# Patient Record
Sex: FEMALE | Race: WHITE | NOT HISPANIC OR LATINO | Employment: STUDENT | URBAN - METROPOLITAN AREA
[De-identification: names, ages, dates, MRNs, and addresses within clinical notes are randomized per-mention and may not be internally consistent; named-entity substitution may affect disease eponyms.]

---

## 2019-05-21 PROBLEM — M54.50 CHRONIC LEFT-SIDED LOW BACK PAIN WITHOUT SCIATICA: Status: ACTIVE | Noted: 2019-05-21

## 2019-05-21 PROBLEM — J06.9 VIRAL UPPER RESPIRATORY ILLNESS: Status: ACTIVE | Noted: 2019-05-21

## 2019-05-21 PROBLEM — M41.115 JUVENILE IDIOPATHIC SCOLIOSIS OF THORACOLUMBAR REGION: Status: ACTIVE | Noted: 2019-05-21

## 2019-05-21 PROBLEM — D50.9 IRON DEFICIENCY ANEMIA: Status: ACTIVE | Noted: 2019-05-21

## 2019-05-21 PROBLEM — Z72.0 TOBACCO ABUSE: Status: ACTIVE | Noted: 2019-05-21

## 2019-05-21 PROBLEM — G89.29 CHRONIC LEFT-SIDED LOW BACK PAIN WITHOUT SCIATICA: Status: ACTIVE | Noted: 2019-05-21

## 2019-05-28 ENCOUNTER — OFFICE VISIT (OUTPATIENT)
Dept: INTERNAL MEDICINE | Facility: CLINIC | Age: 24
End: 2019-05-28

## 2019-05-28 ENCOUNTER — LAB (OUTPATIENT)
Dept: LAB | Facility: HOSPITAL | Age: 24
End: 2019-05-28

## 2019-05-28 VITALS
WEIGHT: 131 LBS | DIASTOLIC BLOOD PRESSURE: 60 MMHG | HEART RATE: 70 BPM | BODY MASS INDEX: 20.56 KG/M2 | HEIGHT: 67 IN | SYSTOLIC BLOOD PRESSURE: 96 MMHG

## 2019-05-28 DIAGNOSIS — E53.8 B12 DEFICIENCY: ICD-10-CM

## 2019-05-28 DIAGNOSIS — D50.0 IRON DEFICIENCY ANEMIA DUE TO CHRONIC BLOOD LOSS: ICD-10-CM

## 2019-05-28 DIAGNOSIS — M25.511 CHRONIC RIGHT SHOULDER PAIN: Primary | ICD-10-CM

## 2019-05-28 DIAGNOSIS — N92.0 MENORRHAGIA WITH REGULAR CYCLE: ICD-10-CM

## 2019-05-28 DIAGNOSIS — M41.115 JUVENILE IDIOPATHIC SCOLIOSIS OF THORACOLUMBAR REGION: ICD-10-CM

## 2019-05-28 DIAGNOSIS — G89.29 CHRONIC RIGHT SHOULDER PAIN: Primary | ICD-10-CM

## 2019-05-28 DIAGNOSIS — Z87.891 QUIT SMOKING WITHIN PAST YEAR: ICD-10-CM

## 2019-05-28 PROBLEM — Z72.0 TOBACCO ABUSE: Status: RESOLVED | Noted: 2019-05-21 | Resolved: 2019-05-28

## 2019-05-28 PROBLEM — M54.50 CHRONIC LEFT-SIDED LOW BACK PAIN WITHOUT SCIATICA: Status: RESOLVED | Noted: 2019-05-21 | Resolved: 2019-05-28

## 2019-05-28 LAB
ANION GAP SERPL CALCULATED.3IONS-SCNC: 9.3 MMOL/L
BASOPHILS # BLD AUTO: 0.03 10*3/MM3 (ref 0–0.2)
BASOPHILS NFR BLD AUTO: 0.6 % (ref 0–1.5)
BUN BLD-MCNC: 10 MG/DL (ref 6–20)
BUN/CREAT SERPL: 16.4 (ref 7–25)
CALCIUM SPEC-SCNC: 9.1 MG/DL (ref 8.6–10.5)
CHLORIDE SERPL-SCNC: 95 MMOL/L (ref 98–107)
CO2 SERPL-SCNC: 27.7 MMOL/L (ref 22–29)
CREAT BLD-MCNC: 0.61 MG/DL (ref 0.57–1)
DEPRECATED RDW RBC AUTO: 43.3 FL (ref 37–54)
EOSINOPHIL # BLD AUTO: 0.14 10*3/MM3 (ref 0–0.4)
EOSINOPHIL NFR BLD AUTO: 2.7 % (ref 0.3–6.2)
ERYTHROCYTE [DISTWIDTH] IN BLOOD BY AUTOMATED COUNT: 11.9 % (ref 12.3–15.4)
FERRITIN SERPL-MCNC: 19.3 NG/ML (ref 13–150)
FOLATE SERPL-MCNC: 19.8 NG/ML (ref 4.78–24.2)
GFR SERPL CREATININE-BSD FRML MDRD: 123 ML/MIN/1.73
GLUCOSE BLD-MCNC: 68 MG/DL (ref 65–99)
HCT VFR BLD AUTO: 42.9 % (ref 34–46.6)
HGB BLD-MCNC: 13.5 G/DL (ref 12–15.9)
IMM GRANULOCYTES # BLD AUTO: 0.02 10*3/MM3 (ref 0–0.05)
IMM GRANULOCYTES NFR BLD AUTO: 0.4 % (ref 0–0.5)
IRON 24H UR-MRATE: 147 MCG/DL (ref 37–145)
IRON SATN MFR SERPL: 31 % (ref 20–50)
LYMPHOCYTES # BLD AUTO: 1.76 10*3/MM3 (ref 0.7–3.1)
LYMPHOCYTES NFR BLD AUTO: 33.6 % (ref 19.6–45.3)
MCH RBC QN AUTO: 30.8 PG (ref 26.6–33)
MCHC RBC AUTO-ENTMCNC: 31.5 G/DL (ref 31.5–35.7)
MCV RBC AUTO: 97.9 FL (ref 79–97)
MONOCYTES # BLD AUTO: 0.37 10*3/MM3 (ref 0.1–0.9)
MONOCYTES NFR BLD AUTO: 7.1 % (ref 5–12)
NEUTROPHILS # BLD AUTO: 2.92 10*3/MM3 (ref 1.7–7)
NEUTROPHILS NFR BLD AUTO: 55.6 % (ref 42.7–76)
NRBC BLD AUTO-RTO: 0 /100 WBC (ref 0–0.2)
PLATELET # BLD AUTO: 265 10*3/MM3 (ref 140–450)
PMV BLD AUTO: 10.6 FL (ref 6–12)
POTASSIUM BLD-SCNC: 4 MMOL/L (ref 3.5–5.2)
RBC # BLD AUTO: 4.38 10*6/MM3 (ref 3.77–5.28)
SODIUM BLD-SCNC: 132 MMOL/L (ref 136–145)
TIBC SERPL-MCNC: 474 MCG/DL (ref 298–536)
TRANSFERRIN SERPL-MCNC: 318 MG/DL (ref 200–360)
TSH SERPL DL<=0.05 MIU/L-ACNC: 2.04 MIU/ML (ref 0.27–4.2)
VIT B12 BLD-MCNC: 384 PG/ML (ref 211–946)
WBC NRBC COR # BLD: 5.24 10*3/MM3 (ref 3.4–10.8)

## 2019-05-28 PROCEDURE — 99214 OFFICE O/P EST MOD 30 MIN: CPT | Performed by: INTERNAL MEDICINE

## 2019-05-28 PROCEDURE — 84443 ASSAY THYROID STIM HORMONE: CPT

## 2019-05-28 PROCEDURE — 83540 ASSAY OF IRON: CPT

## 2019-05-28 PROCEDURE — 85025 COMPLETE CBC W/AUTO DIFF WBC: CPT

## 2019-05-28 PROCEDURE — 80048 BASIC METABOLIC PNL TOTAL CA: CPT

## 2019-05-28 PROCEDURE — 82607 VITAMIN B-12: CPT

## 2019-05-28 PROCEDURE — 82746 ASSAY OF FOLIC ACID SERUM: CPT

## 2019-05-28 PROCEDURE — 84466 ASSAY OF TRANSFERRIN: CPT

## 2019-05-28 PROCEDURE — 82728 ASSAY OF FERRITIN: CPT

## 2019-05-28 RX ORDER — IRON,CARBONYL/ASCORBIC ACID 100-250 MG
1 TABLET ORAL DAILY
COMMUNITY
Start: 2018-05-22 | End: 2019-05-28

## 2019-05-28 RX ORDER — LANOLIN ALCOHOL/MO/W.PET/CERES
CREAM (GRAM) TOPICAL
COMMUNITY
Start: 2018-05-22 | End: 2022-06-14

## 2019-05-28 RX ORDER — FLUTICASONE PROPIONATE 50 MCG
1-2 SPRAY, SUSPENSION (ML) NASAL DAILY
COMMUNITY
Start: 2017-04-04 | End: 2019-05-28

## 2019-05-28 RX ORDER — NAPROXEN SODIUM 220 MG
1 TABLET ORAL EVERY 12 HOURS
COMMUNITY
Start: 2016-11-09 | End: 2019-05-28

## 2019-05-28 RX ORDER — NORGESTREL AND ETHINYL ESTRADIOL 0.3-0.03MG
KIT ORAL
Refills: 1 | COMMUNITY
Start: 2019-05-21 | End: 2019-08-27 | Stop reason: ALTCHOICE

## 2019-05-28 NOTE — PATIENT INSTRUCTIONS
For right shoulder pain and popping, we are referring to [facility].  She probably has some rotator cuff laxity.  Strengthening the muscles should help with her discomfort.    For  heavy periods with regular cycle and iron and B12 deficiency, we will recheck   iron and B12 and folate levels today.  She will continue taking the oral contraceptive.    For the scoliosis, she will continue walking and exercising.    She is to be congratulating for quitting smoking on January 3!

## 2019-05-28 NOTE — PROGRESS NOTES
"[facility]     Fiona Thacker  1995   [id number]      Patient Care Team:  Yi Perez MD as PCP - General (Internal Medicine)    Chief Complaint;:   Chief Complaint   Patient presents with   • shoulder     L shoulder is popping and becomes \"uncomfortable or weak\", has been popping for 6 months            HPI  Patient is a 24 y.o. female presents with shoulder pain. Onset of symptoms was abrupt starting 6 months ago.  Chronicity chronic. Severity mild .  Symptoms are associated with popping. Pertinent negatives no known injury.   Symptoms are aggravated by movement.   Symptoms improve with nothing.  Context no injury. Felt it first when turned over in bed one night. Sometimes R shoulder pops a little too.      CHRONIC CONDITIONS    Periods still very heavy but regular.  Takes OCPs. Doesn't take iron much.    Takes B12 for B12 defic pretty regularly.    Scoliosis has not caused any back pain lately. Exercises regularly.  In college. Will graduate in December.    Past Medical History:   Diagnosis Date   • Anemia    • Chronic left-sided low back pain without sciatica 2019   • Menorrhagia     improved with OCPs   • Tobacco abuse 2019       Past Surgical History:   Procedure Laterality Date   • TONSILLECTOMY         Family History   Problem Relation Age of Onset   • Hypertension Mother    • Scoliosis Mother    • Diabetes Maternal Grandmother         TYPE 2    • Cancer Maternal Grandfather 50        prostate   • Arthritis Other        Social History     Socioeconomic History   • Marital status: Single     Spouse name: Not on file   • Number of children: Not on file   • Years of education: Not on file   • Highest education level: Not on file   Tobacco Use   • Smoking status: Former Smoker     Packs/day: 0.50     Types: Cigarettes     Last attempt to quit: 1/3/2019     Years since quittin.3   • Smokeless tobacco: Never Used   Substance and Sexual Activity   • Alcohol use: No     " "Frequency: Never   • Drug use: Defer   • Sexual activity: Defer       Allergies   Allergen Reactions   • Penicillins Unknown (See Comments)     Childhood allergy       Review of Systems:     Review of Systems   Constitutional: Negative for chills, fatigue and fever.   HENT: Negative for sore throat.    Eyes: Positive for visual disturbance.   Respiratory: Negative for cough, shortness of breath and wheezing.    Cardiovascular: Negative for chest pain and palpitations.   Gastrointestinal: Negative for abdominal pain, blood in stool, constipation, diarrhea and GERD.   Genitourinary: Negative for dysuria and frequency.   Musculoskeletal: Positive for arthralgias. Negative for back pain and gait problem.   Neurological: Negative for numbness and headache.   Psychiatric/Behavioral: Negative for depressed mood. The patient is not nervous/anxious.        Vital Signs  Vitals:    05/28/19 1246   BP: 96/60   BP Location: Left arm   Patient Position: Sitting   Cuff Size: Adult   Pulse: 70   Weight: 59.4 kg (131 lb)   Height: 170.2 cm (67\")     Body mass index is 20.52 kg/m².      Current Outpatient Medications:   •  LOW-OGESTREL 0.3-30 MG-MCG per tablet, , Disp: , Rfl: 1  •  vitamin B-12 (CYANOCOBALAMIN) 1000 MCG tablet, take 1 Tablet by Oral route once a day, Disp: , Rfl:     Physical Exam:    Physical Exam   Constitutional: She is oriented to person, place, and time. She appears well-developed and well-nourished.   HENT:   Head: Normocephalic.   Eyes: Conjunctivae and EOM are normal. Pupils are equal, round, and reactive to light.   Neck: Normal range of motion. Neck supple. No thyromegaly present.   Cardiovascular: Normal rate, regular rhythm, normal heart sounds and intact distal pulses.   Pulmonary/Chest: Effort normal and breath sounds normal.   Musculoskeletal: Normal range of motion. She exhibits no edema.        Left shoulder: She exhibits normal range of motion, no tenderness, no swelling and no deformity.        " Thoracic back: She exhibits deformity.   Mild popping when she rolls the left shoulder.    Thoracolumbar scoliosis.   Lymphadenopathy:     She has no cervical adenopathy.   Neurological: She is alert and oriented to person, place, and time.   Psychiatric: She has a normal mood and affect. Thought content normal.   Nursing note and vitals reviewed.       ACE III MINI        Results Review:    None    CMP:     HbA1c:  No results found for: HGBA1C  Microalbumin:  No results found for: MICROALBUR, POCMALB, POCCREAT  Lipid Panel  No results found for: CHOL, TRIG, HDL, LDL, AST, ALT    Medication Review: Medications reviewed and noted    Problem List Items Addressed This Visit        Digestive    B12 deficiency    Relevant Orders    Vitamin B12    Folate       Nervous and Auditory    Chronic right shoulder pain - Primary    Relevant Orders    Ambulatory Referral to Physical Therapy Evaluate and treat (Completed)       Musculoskeletal and Integument    Juvenile idiopathic scoliosis of thoracolumbar region       Genitourinary    Menorrhagia with regular cycle       Hematopoietic and Hemostatic    Iron deficiency anemia    Relevant Medications    vitamin B-12 (CYANOCOBALAMIN) 1000 MCG tablet    Other Relevant Orders    CBC & Differential    Basic Metabolic Panel    Ferritin    Iron Profile    TSH      Other Visit Diagnoses     Quit smoking within past year        Quit on 1/3/19           Patient Instructions   For right shoulder pain and popping, we are referring to  physical therapy.  She probably has some rotator cuff laxity.  Strengthening the muscles should help with her discomfort.    For  heavy periods with regular cycle and iron and B12 deficiency, we will recheck   iron and B12 and folate levels today.  She will continue taking the oral contraceptive.    For the scoliosis, she will continue walking and exercising.    She is to be congratulating for quitting smoking on January 3!      Plan of care reviewed with  patient at the conclusion of today's visit. Education was provided regarding diagnosis, management, and any prescribed or recommended OTC medications.Patient verbalizes understanding of and agreement with management plan.         Yi Perez MD

## 2019-08-27 ENCOUNTER — OFFICE VISIT (OUTPATIENT)
Dept: INTERNAL MEDICINE | Facility: CLINIC | Age: 24
End: 2019-08-27

## 2019-08-27 VITALS
SYSTOLIC BLOOD PRESSURE: 104 MMHG | WEIGHT: 134 LBS | DIASTOLIC BLOOD PRESSURE: 62 MMHG | BODY MASS INDEX: 21.03 KG/M2 | HEART RATE: 68 BPM | HEIGHT: 67 IN | TEMPERATURE: 98.4 F

## 2019-08-27 DIAGNOSIS — L23.7 ALLERGIC CONTACT DERMATITIS DUE TO PLANTS, EXCEPT FOOD: Primary | ICD-10-CM

## 2019-08-27 PROCEDURE — 96372 THER/PROPH/DIAG INJ SC/IM: CPT | Performed by: PHYSICIAN ASSISTANT

## 2019-08-27 PROCEDURE — 99213 OFFICE O/P EST LOW 20 MIN: CPT | Performed by: PHYSICIAN ASSISTANT

## 2019-08-27 RX ORDER — TRIAMCINOLONE ACETONIDE 40 MG/ML
80 INJECTION, SUSPENSION INTRA-ARTICULAR; INTRAMUSCULAR ONCE
Status: COMPLETED | OUTPATIENT
Start: 2019-08-27 | End: 2019-08-27

## 2019-08-27 RX ORDER — TRIAMCINOLONE ACETONIDE 5 MG/G
OINTMENT TOPICAL 2 TIMES DAILY
Qty: 15 G | Refills: 0 | Status: SHIPPED | OUTPATIENT
Start: 2019-08-27 | End: 2022-06-14

## 2019-08-27 RX ORDER — NORETHINDRONE ACETATE AND ETHINYL ESTRADIOL, AND FERROUS FUMARATE 1MG-20(24)
1 KIT ORAL DAILY
COMMUNITY
End: 2022-06-14

## 2019-08-27 RX ADMIN — TRIAMCINOLONE ACETONIDE 80 MG: 40 INJECTION, SUSPENSION INTRA-ARTICULAR; INTRAMUSCULAR at 13:04

## 2019-08-27 NOTE — PROGRESS NOTES
Patient Care Team:  Yi Perez MD as PCP - General (Internal Medicine)    Chief Complaint::   Chief Complaint   Patient presents with   • Poison Ivy        Subjective     HPI  Rash   This is a new problem. The current episode started 1 to 4 weeks ago. The problem is unchanged. The affected locations include the torso, right upper leg, right lower leg, left buttock, right buttock, left arm and right arm. The rash is characterized by itchiness and redness. She was exposed to plant contact. Pertinent negatives include no congestion, cough, fatigue, fever or shortness of breath. Past treatments include anti-itch cream. The treatment provided no relief. Her past medical history is significant for allergies.       PMH  The following portions of the patient's history were reviewed and updated as appropriate: allergies, current medications, past family history, past medical history, past social history, past surgical history and problem list.    Review of Systems:   Review of Systems   Constitutional: Negative for activity change, appetite change, chills, fatigue and fever.   HENT: Negative for congestion, ear pain and sinus pressure.    Respiratory: Negative for cough, chest tightness, shortness of breath and wheezing.    Cardiovascular: Negative for chest pain and palpitations.   Gastrointestinal: Negative for abdominal pain, blood in stool and constipation.   Skin: Positive for rash and skin lesions. Negative for color change.   Allergic/Immunologic: Positive for environmental allergies. Negative for immunocompromised state.   Neurological: Negative for dizziness, speech difficulty and headache.   Hematological: Negative for adenopathy. Does not bruise/bleed easily.   Psychiatric/Behavioral: Negative for decreased concentration. The patient is not nervous/anxious.        Vital Signs  Vitals:    08/27/19 1241   BP: 104/62   BP Location: Left arm   Patient Position: Sitting   Cuff Size: Adult   Pulse: 68   Temp:  "98.4 °F (36.9 °C)   TempSrc: Temporal   Weight: 60.8 kg (134 lb)   Height: 170.2 cm (67.01\")   PainSc: 0-No pain     Body mass index is 20.98 kg/m².    Labs  Lab on 05/28/2019   Component Date Value Ref Range Status   • Glucose 05/28/2019 68  65 - 99 mg/dL Final   • BUN 05/28/2019 10  6 - 20 mg/dL Final   • Creatinine 05/28/2019 0.61  0.57 - 1.00 mg/dL Final   • Sodium 05/28/2019 132* 136 - 145 mmol/L Final   • Potassium 05/28/2019 4.0  3.5 - 5.2 mmol/L Final   • Chloride 05/28/2019 95* 98 - 107 mmol/L Final   • CO2 05/28/2019 27.7  22.0 - 29.0 mmol/L Final   • Calcium 05/28/2019 9.1  8.6 - 10.5 mg/dL Final   • eGFR Non African Amer 05/28/2019 123  >60 mL/min/1.73 Final   • BUN/Creatinine Ratio 05/28/2019 16.4  7.0 - 25.0 Final   • Anion Gap 05/28/2019 9.3  mmol/L Final   • Ferritin 05/28/2019 19.30  13.00 - 150.00 ng/mL Final   • Iron 05/28/2019 147* 37 - 145 mcg/dL Final   • Iron Saturation 05/28/2019 31  20 - 50 % Final   • Transferrin 05/28/2019 318  200 - 360 mg/dL Final   • TIBC 05/28/2019 474  298 - 536 mcg/dL Final   • Vitamin B-12 05/28/2019 384  211 - 946 pg/mL Final   • Folate 05/28/2019 19.80  4.78 - 24.20 ng/mL Final   • TSH 05/28/2019 2.040  0.270 - 4.200 mIU/mL Final   • WBC 05/28/2019 5.24  3.40 - 10.80 10*3/mm3 Final   • RBC 05/28/2019 4.38  3.77 - 5.28 10*6/mm3 Final   • Hemoglobin 05/28/2019 13.5  12.0 - 15.9 g/dL Final   • Hematocrit 05/28/2019 42.9  34.0 - 46.6 % Final   • MCV 05/28/2019 97.9* 79.0 - 97.0 fL Final   • MCH 05/28/2019 30.8  26.6 - 33.0 pg Final   • MCHC 05/28/2019 31.5  31.5 - 35.7 g/dL Final   • RDW 05/28/2019 11.9* 12.3 - 15.4 % Final   • RDW-SD 05/28/2019 43.3  37.0 - 54.0 fl Final   • MPV 05/28/2019 10.6  6.0 - 12.0 fL Final   • Platelets 05/28/2019 265  140 - 450 10*3/mm3 Final   • Neutrophil % 05/28/2019 55.6  42.7 - 76.0 % Final   • Lymphocyte % 05/28/2019 33.6  19.6 - 45.3 % Final   • Monocyte % 05/28/2019 7.1  5.0 - 12.0 % Final   • Eosinophil % 05/28/2019 2.7  0.3 - 6.2 " % Final   • Basophil % 05/28/2019 0.6  0.0 - 1.5 % Final   • Immature Grans % 05/28/2019 0.4  0.0 - 0.5 % Final   • Neutrophils, Absolute 05/28/2019 2.92  1.70 - 7.00 10*3/mm3 Final   • Lymphocytes, Absolute 05/28/2019 1.76  0.70 - 3.10 10*3/mm3 Final   • Monocytes, Absolute 05/28/2019 0.37  0.10 - 0.90 10*3/mm3 Final   • Eosinophils, Absolute 05/28/2019 0.14  0.00 - 0.40 10*3/mm3 Final   • Basophils, Absolute 05/28/2019 0.03  0.00 - 0.20 10*3/mm3 Final   • Immature Grans, Absolute 05/28/2019 0.02  0.00 - 0.05 10*3/mm3 Final   • nRBC 05/28/2019 0.0  0.0 - 0.2 /100 WBC Final       Imaging  No radiology results for the last 30 days.      Current Outpatient Medications:   •  Norethin Ace-Eth Estrad-FE (TAYTULLA) 1-20 MG-MCG(24) capsule, Take 1 tablet by mouth Daily., Disp: , Rfl:   •  vitamin B-12 (CYANOCOBALAMIN) 1000 MCG tablet, take 1 Tablet by Oral route once a day, Disp: , Rfl:   •  triamcinolone (KENALOG) 0.5 % ointment, Apply  topically to the appropriate area as directed 2 (Two) Times a Day., Disp: 15 g, Rfl: 0  No current facility-administered medications for this visit.     Physical Exam:    Physical Exam   Constitutional: She appears well-developed and well-nourished.   HENT:   Head: Normocephalic and atraumatic.   Nose: Nose normal.   Mouth/Throat: Oropharynx is clear and moist.   Eyes: Conjunctivae and EOM are normal. Pupils are equal, round, and reactive to light.   Neck: Normal range of motion. Neck supple.   Cardiovascular: Normal rate, regular rhythm, normal heart sounds and intact distal pulses.   Pulmonary/Chest: Effort normal and breath sounds normal.   Skin: Skin is warm and dry. Rash noted. Rash is papular, vesicular and urticarial.   Rash is located on both sides of trunk, arms, and both legs.   Psychiatric: She has a normal mood and affect. Her behavior is normal. Judgment and thought content normal.   Nursing note and vitals reviewed.      Procedures        Assessment/Plan   Problem List Items  Addressed This Visit     None      Visit Diagnoses     Allergic contact dermatitis due to plants, except food    -  Primary    Relevant Medications    triamcinolone acetonide (KENALOG-40) injection 80 mg (Completed) (Start on 8/27/2019  1:45 PM)    triamcinolone (KENALOG) 0.5 % ointment          Return if symptoms worsen or fail to improve.    Plan of care reviewed with patient at the conclusion of today's visit. Education was provided regarding diagnosis, management, and any prescribed or recommended OTC medications.Patient verbalizes understanding of and agreement with management plan.     Deana Costa PA-C

## 2021-03-23 ENCOUNTER — BULK ORDERING (OUTPATIENT)
Dept: CASE MANAGEMENT | Facility: OTHER | Age: 26
End: 2021-03-23

## 2021-03-23 DIAGNOSIS — Z23 IMMUNIZATION DUE: ICD-10-CM

## 2022-06-14 ENCOUNTER — OFFICE VISIT (OUTPATIENT)
Dept: INTERNAL MEDICINE | Facility: CLINIC | Age: 27
End: 2022-06-14

## 2022-06-14 ENCOUNTER — LAB (OUTPATIENT)
Dept: LAB | Facility: HOSPITAL | Age: 27
End: 2022-06-14

## 2022-06-14 VITALS
HEIGHT: 67 IN | HEART RATE: 77 BPM | DIASTOLIC BLOOD PRESSURE: 60 MMHG | SYSTOLIC BLOOD PRESSURE: 112 MMHG | TEMPERATURE: 98.2 F | OXYGEN SATURATION: 97 % | WEIGHT: 128.6 LBS | BODY MASS INDEX: 20.18 KG/M2

## 2022-06-14 DIAGNOSIS — R53.83 OTHER FATIGUE: ICD-10-CM

## 2022-06-14 DIAGNOSIS — L65.9 HAIR LOSS: Chronic | ICD-10-CM

## 2022-06-14 DIAGNOSIS — E53.8 B12 DEFICIENCY: ICD-10-CM

## 2022-06-14 DIAGNOSIS — R53.83 OTHER FATIGUE: Primary | Chronic | ICD-10-CM

## 2022-06-14 DIAGNOSIS — D50.0 IRON DEFICIENCY ANEMIA DUE TO CHRONIC BLOOD LOSS: ICD-10-CM

## 2022-06-14 DIAGNOSIS — L65.9 HAIR LOSS: ICD-10-CM

## 2022-06-14 DIAGNOSIS — Z13.220 LIPID SCREENING: ICD-10-CM

## 2022-06-14 DIAGNOSIS — N92.0 MENORRHAGIA WITH REGULAR CYCLE: ICD-10-CM

## 2022-06-14 DIAGNOSIS — R19.5 CHANGE IN STOOL: Chronic | ICD-10-CM

## 2022-06-14 PROBLEM — E86.0 DEHYDRATION: Chronic | Status: ACTIVE | Noted: 2022-06-14

## 2022-06-14 LAB
ALBUMIN SERPL-MCNC: 4.6 G/DL (ref 3.5–5.2)
ALBUMIN/GLOB SERPL: 2.6 G/DL
ALP SERPL-CCNC: 47 U/L (ref 39–117)
ALT SERPL W P-5'-P-CCNC: 10 U/L (ref 1–33)
ANION GAP SERPL CALCULATED.3IONS-SCNC: 10.8 MMOL/L (ref 5–15)
AST SERPL-CCNC: 12 U/L (ref 1–32)
BACTERIA UR QL AUTO: NORMAL /HPF
BASOPHILS # BLD AUTO: 0.03 10*3/MM3 (ref 0–0.2)
BASOPHILS NFR BLD AUTO: 0.7 % (ref 0–1.5)
BILIRUB SERPL-MCNC: 0.4 MG/DL (ref 0–1.2)
BILIRUB UR QL STRIP: NEGATIVE
BUN SERPL-MCNC: 8 MG/DL (ref 6–20)
BUN/CREAT SERPL: 12.7 (ref 7–25)
CALCIUM SPEC-SCNC: 9 MG/DL (ref 8.6–10.5)
CHLORIDE SERPL-SCNC: 106 MMOL/L (ref 98–107)
CHOLEST SERPL-MCNC: 171 MG/DL (ref 0–200)
CLARITY UR: CLEAR
CO2 SERPL-SCNC: 22.2 MMOL/L (ref 22–29)
COLOR UR: YELLOW
CREAT SERPL-MCNC: 0.63 MG/DL (ref 0.57–1)
DEPRECATED RDW RBC AUTO: 37.6 FL (ref 37–54)
EGFRCR SERPLBLD CKD-EPI 2021: 126.4 ML/MIN/1.73
EOSINOPHIL # BLD AUTO: 0.12 10*3/MM3 (ref 0–0.4)
EOSINOPHIL NFR BLD AUTO: 2.9 % (ref 0.3–6.2)
ERYTHROCYTE [DISTWIDTH] IN BLOOD BY AUTOMATED COUNT: 11.7 % (ref 12.3–15.4)
FERRITIN SERPL-MCNC: 18.4 NG/ML (ref 13–150)
GLOBULIN UR ELPH-MCNC: 1.8 GM/DL
GLUCOSE SERPL-MCNC: 83 MG/DL (ref 65–99)
GLUCOSE UR STRIP-MCNC: NEGATIVE MG/DL
HCT VFR BLD AUTO: 38 % (ref 34–46.6)
HDLC SERPL-MCNC: 68 MG/DL (ref 40–60)
HGB BLD-MCNC: 12.8 G/DL (ref 12–15.9)
HGB UR QL STRIP.AUTO: NEGATIVE
HYALINE CASTS UR QL AUTO: NORMAL /LPF
IMM GRANULOCYTES # BLD AUTO: 0 10*3/MM3 (ref 0–0.05)
IMM GRANULOCYTES NFR BLD AUTO: 0 % (ref 0–0.5)
KETONES UR QL STRIP: ABNORMAL
LDLC SERPL CALC-MCNC: 92 MG/DL (ref 0–100)
LDLC/HDLC SERPL: 1.36 {RATIO}
LEUKOCYTE ESTERASE UR QL STRIP.AUTO: NEGATIVE
LYMPHOCYTES # BLD AUTO: 1.84 10*3/MM3 (ref 0.7–3.1)
LYMPHOCYTES NFR BLD AUTO: 44.1 % (ref 19.6–45.3)
MCH RBC QN AUTO: 30.4 PG (ref 26.6–33)
MCHC RBC AUTO-ENTMCNC: 33.7 G/DL (ref 31.5–35.7)
MCV RBC AUTO: 90.3 FL (ref 79–97)
MONOCYTES # BLD AUTO: 0.35 10*3/MM3 (ref 0.1–0.9)
MONOCYTES NFR BLD AUTO: 8.4 % (ref 5–12)
NEUTROPHILS NFR BLD AUTO: 1.83 10*3/MM3 (ref 1.7–7)
NEUTROPHILS NFR BLD AUTO: 43.9 % (ref 42.7–76)
NITRITE UR QL STRIP: NEGATIVE
NRBC BLD AUTO-RTO: 0 /100 WBC (ref 0–0.2)
PH UR STRIP.AUTO: 6.5 [PH] (ref 5–8)
PLATELET # BLD AUTO: 230 10*3/MM3 (ref 140–450)
PMV BLD AUTO: 10.2 FL (ref 6–12)
POTASSIUM SERPL-SCNC: 4.1 MMOL/L (ref 3.5–5.2)
PROT SERPL-MCNC: 6.4 G/DL (ref 6–8.5)
PROT UR QL STRIP: NEGATIVE
RBC # BLD AUTO: 4.21 10*6/MM3 (ref 3.77–5.28)
RBC # UR STRIP: NORMAL /HPF
REF LAB TEST METHOD: NORMAL
SODIUM SERPL-SCNC: 139 MMOL/L (ref 136–145)
SP GR UR STRIP: 1.03 (ref 1–1.03)
SQUAMOUS #/AREA URNS HPF: NORMAL /HPF
TRIGL SERPL-MCNC: 54 MG/DL (ref 0–150)
TSH SERPL DL<=0.05 MIU/L-ACNC: 1.72 UIU/ML (ref 0.27–4.2)
UROBILINOGEN UR QL STRIP: ABNORMAL
VLDLC SERPL-MCNC: 11 MG/DL (ref 5–40)
WBC # UR STRIP: NORMAL /HPF
WBC NRBC COR # BLD: 4.17 10*3/MM3 (ref 3.4–10.8)

## 2022-06-14 PROCEDURE — 80050 GENERAL HEALTH PANEL: CPT

## 2022-06-14 PROCEDURE — 81001 URINALYSIS AUTO W/SCOPE: CPT

## 2022-06-14 PROCEDURE — 82728 ASSAY OF FERRITIN: CPT

## 2022-06-14 PROCEDURE — 84466 ASSAY OF TRANSFERRIN: CPT

## 2022-06-14 PROCEDURE — 83540 ASSAY OF IRON: CPT

## 2022-06-14 PROCEDURE — 80061 LIPID PANEL: CPT

## 2022-06-14 PROCEDURE — 99214 OFFICE O/P EST MOD 30 MIN: CPT | Performed by: INTERNAL MEDICINE

## 2022-06-14 PROCEDURE — 82607 VITAMIN B-12: CPT

## 2022-06-14 PROCEDURE — 82306 VITAMIN D 25 HYDROXY: CPT

## 2022-06-14 NOTE — PROGRESS NOTES
"[facility]     Fiona Thacker  1995   [id number]      Patient Care Team:  Yi Perez MD as PCP - General (Internal Medicine)  Gisela Crocker MD as Obstetrician (Obstetrics and Gynecology)    Chief Complaint   Patient presents with   • Fatigue   • Alopecia            HPI  Patient is a 27 y.o. female presents for evaluation of fatigue.    Fatigue  The patient reports that she has experienced fatigue since discontinuing birth control over 1 year ago. After her period, she reports a lot of energy, but leading up to her period her energy is very low. She will experience lightheadedness at times when \"overworking\" herself and she will \"see black\" for a minute. She reports falling from these episodes previously. Her water intake will vary. Yesterday she drank approximately 1.5 gallons of water. If she is not active, she will only drink 1 to 2 glasses of water.     She reports hair loss immediately after discontinuation of birth control and this has returned again suddenly. She states that her ponytail is not as thick as it used to be. She does have a lot of new baby hair. She does not notice a significant change in her hair texture but states it is thinner. She notes that her nails are thin and dry. Her weight has not changed since her last office visit 2 years ago. She has moved and changed jobs.     She notes that she discontinued birth control due to being on her previous birth control for 10 years and she began spotting. She was tried on Taytulla and another birth control for approximately 6 months on each, and she felt like she was \"going crazy with mood swings\" so she discontinued those. She was offered an IUD and declined. She states she \"loves being off birth control.\" She reports that her mood swings have resolved and her \"whole mood has changed.\" Her gynecologist was Gisela Crocker. Her periods are regular and very heavy, but she only experiences only mild pain. She continues to be " "sexually active. She uses condoms without spermicide. She has not noticed bald spots. She is not on prescription medications. She will occasionally take Advil.     She reports she has not been eating very well having more fatty foods. She will experience phases of being constipated or experience diarrhea which is new for her, but she does feel it is food related. She has not been exercising a lot, but states she is remodeling a home and is physically active. She walks with her dog. She notes that she was a vegetarian for approximately 11 years, but has restarted eating meat about 6 months ago.     [place]     She has noticed that her knees and shoulders \"pops\" a lot. She used to run often. She also notes that he wrists are \"bad\" and she will wake up and have pain in the joints of her hand.         CHRONIC CONDITIONS      Past Medical History:   Diagnosis Date   • Anemia    • Chronic left-sided low back pain without sciatica 5/21/2019   • Menorrhagia     improved with OCPs   • Tobacco abuse 5/21/2019       Past Surgical History:   Procedure Laterality Date   • TONSILLECTOMY  2001       Family History   Problem Relation Age of Onset   • Hypertension Mother    • Scoliosis Mother    • Diabetes Maternal Grandmother         TYPE 2    • Cancer Maternal Grandfather 50        prostate   • Arthritis Other        Social History     Socioeconomic History   • Marital status: Single   Tobacco Use   • Smoking status: Former Smoker     Packs/day: 0.50     Types: Cigarettes     Quit date: 1/3/2019     Years since quitting: 3.4   • Smokeless tobacco: Never Used   Substance and Sexual Activity   • Alcohol use: No   • Drug use: Defer   • Sexual activity: Defer       Allergies   Allergen Reactions   • Penicillins Unknown (See Comments)     Childhood allergy       Review of Systems:     Review of Systems    Vital Signs  Vitals:    06/14/22 1512   BP: 112/60   BP Location: Left arm " "  Patient Position: Sitting   Cuff Size: Adult   Pulse: 77   Temp: 98.2 °F (36.8 °C)   TempSrc: Infrared   SpO2: 97%   Weight: 58.3 kg (128 lb 9.6 oz)   Height: 170.2 cm (67.01\")   PainSc: 0-No pain     Body mass index is 20.14 kg/m².  BMI is within normal parameters. No other follow-up for BMI required.      No current outpatient medications on file.    Physical Exam:    Physical Exam  Vitals and nursing note reviewed.   Constitutional:       Appearance: She is well-developed.      Comments: Scalp is normal. There is a lot of new hair growth. Nails are normal.    HENT:      Head: Normocephalic.   Eyes:      Conjunctiva/sclera: Conjunctivae normal.      Pupils: Pupils are equal, round, and reactive to light.   Neck:      Thyroid: No thyromegaly.      Comments: No thyromegaly or thyroid nodules.  Cardiovascular:      Rate and Rhythm: Normal rate and regular rhythm.      Heart sounds: Normal heart sounds.   Pulmonary:      Effort: Pulmonary effort is normal.      Breath sounds: Normal breath sounds. No wheezing.   Musculoskeletal:         General: Normal range of motion.      Cervical back: Normal range of motion and neck supple.   Lymphadenopathy:      Cervical: No cervical adenopathy.   Skin:     General: Skin is warm and dry.   Neurological:      Mental Status: She is alert and oriented to person, place, and time.   Psychiatric:         Thought Content: Thought content normal.          ACE III MINI        Results Review:    None    CMP:  Lab Results   Component Value Date    BUN 8 06/14/2022    CREATININE 0.63 06/14/2022    EGFRIFNONA 123 05/28/2019    BCR 12.7 06/14/2022     06/14/2022    K 4.1 06/14/2022    CO2 22.2 06/14/2022    CALCIUM 9.0 06/14/2022    ALBUMIN 4.60 06/14/2022    BILITOT 0.4 06/14/2022    ALKPHOS 47 06/14/2022    AST 12 06/14/2022    ALT 10 06/14/2022     HbA1c:  No results found for: HGBA1C  Microalbumin:  No results found for: MICROALBUR, POCMALB, POCCREAT  Lipid Panel  Lab Results "   Component Value Date    CHOL 171 06/14/2022    TRIG 54 06/14/2022    HDL 68 (H) 06/14/2022    LDL 92 06/14/2022    AST 12 06/14/2022    ALT 10 06/14/2022       Medication Review: Medications reviewed and noted  Patient Instructions   Problem List Items Addressed This Visit        Endocrine and Metabolic    B12 deficiency       Gastrointestinal Abdominal     Change in stool (Chronic)       Genitourinary and Reproductive     Menorrhagia with regular cycle       Hematology and Neoplasia    Iron deficiency anemia    Relevant Orders    Ferritin (Completed)    Iron Profile (Completed)       Skin    Hair loss (Chronic)    Relevant Orders    TSH (Completed)    Vitamin D 25 Hydroxy (Completed)    Urinalysis With Microscopic - Urine, Clean Catch (Completed)       Symptoms and Signs    Other fatigue - Primary (Chronic)    Relevant Orders    Comprehensive Metabolic Panel (Completed)    CBC & Differential (Completed)    Vitamin B12 (Completed)      Other Visit Diagnoses     Lipid screening        Relevant Orders    Lipid Panel (Completed)             Diagnosis Plan   1. Other fatigue  Comprehensive Metabolic Panel    CBC & Differential    Vitamin B12   2. Hair loss  TSH    Vitamin D 25 Hydroxy    Urinalysis With Microscopic - Urine, Clean Catch  We will check thyroid levels. She was reassured that there is a lot of new hair growth. The hair loss may have been stress related due to her recent move a few months ago and getting a new job.     3. Menorrhagia with regular cycle  She should follow up with her GYN. We discussed the possibility of getting pregnant since they are only using condoms for birth control. She states that she is comfortable with the possibility of pregnancy.     4. Iron deficiency anemia due to chronic blood loss  Ferritin    Iron Profile   5. B12 deficiency     6. Change in stool  Alternating diarrhea and constipation seems to be food related. She has noticed looser stools when she is eating more fatty  foods. She will try to decrease the fats and carbohydrates in the diet.     7. Lipid screening  Lipid Panel           Plan of care reviewed with patient at the conclusion of today's visit. Education was provided regarding diagnosis, management, and any prescribed or recommended OTC medications.Patient verbalizes understanding of and agreement with management plan.         Yi Perez MD    Transcribed from ambient dictation for Yi Perez MD by Rowan Souza..  06/14/22   18:45 EDT    Patient verbalized consent to the visit recording.

## 2022-06-15 LAB
25(OH)D3 SERPL-MCNC: 32 NG/ML (ref 30–100)
IRON 24H UR-MRATE: 98 MCG/DL (ref 37–145)
IRON SATN MFR SERPL: 25 % (ref 20–50)
TIBC SERPL-MCNC: 384 MCG/DL (ref 298–536)
TRANSFERRIN SERPL-MCNC: 258 MG/DL (ref 200–360)
VIT B12 BLD-MCNC: 360 PG/ML (ref 211–946)

## 2022-06-15 NOTE — PATIENT INSTRUCTIONS
Patient Instructions   Problem List Items Addressed This Visit          Endocrine and Metabolic    B12 deficiency       Gastrointestinal Abdominal     Change in stool (Chronic)       Genitourinary and Reproductive     Menorrhagia with regular cycle       Hematology and Neoplasia    Iron deficiency anemia    Relevant Orders    Ferritin (Completed)    Iron Profile (Completed)       Skin    Hair loss (Chronic)    Relevant Orders    TSH (Completed)    Vitamin D 25 Hydroxy (Completed)    Urinalysis With Microscopic - Urine, Clean Catch (Completed)       Symptoms and Signs    Other fatigue - Primary (Chronic)    Relevant Orders    Comprehensive Metabolic Panel (Completed)    CBC & Differential (Completed)    Vitamin B12 (Completed)          Other Visit Diagnoses       Lipid screening        Relevant Orders    Lipid Panel (Completed)            Problem List Items Addressed This Visit          Endocrine and Metabolic    B12 deficiency       Gastrointestinal Abdominal     Change in stool (Chronic)       Genitourinary and Reproductive     Menorrhagia with regular cycle       Hematology and Neoplasia    Iron deficiency anemia    Relevant Orders    Ferritin (Completed)    Iron Profile (Completed)       Skin    Hair loss (Chronic)    Relevant Orders    TSH (Completed)    Vitamin D 25 Hydroxy (Completed)    Urinalysis With Microscopic - Urine, Clean Catch (Completed)       Symptoms and Signs    Other fatigue - Primary (Chronic)    Relevant Orders    Comprehensive Metabolic Panel (Completed)    CBC & Differential (Completed)    Vitamin B12 (Completed)          Other Visit Diagnoses       Lipid screening        Relevant Orders    Lipid Panel (Completed)

## 2023-03-23 ENCOUNTER — TELEPHONE (OUTPATIENT)
Dept: INTERNAL MEDICINE | Facility: CLINIC | Age: 28
End: 2023-03-23
Payer: COMMERCIAL

## 2023-03-23 NOTE — TELEPHONE ENCOUNTER
PATIENT HAS CALLED REQUESTING IF ANYWAY POSSIBLE TO RESCHEDULE HER APPOINTMENT THAT IS SCHEDULED FOR 04/17/2023 TO 04/10/2023 AFTER 1 PM. PATIENT WILL BE IN TOWN THAT DAY AND LIVES OVER 2 HOURS AWAY.  PATIENT IS REQUESTING A CALL BACK ASAP TO LET HER KNOW EITHER WAY.    CALL BACK NUMBER [phone]

## 2023-04-10 ENCOUNTER — LAB (OUTPATIENT)
Dept: LAB | Facility: HOSPITAL | Age: 28
End: 2023-04-10
Payer: COMMERCIAL

## 2023-04-10 ENCOUNTER — OFFICE VISIT (OUTPATIENT)
Dept: INTERNAL MEDICINE | Facility: CLINIC | Age: 28
End: 2023-04-10
Payer: COMMERCIAL

## 2023-04-10 VITALS
HEIGHT: 67 IN | SYSTOLIC BLOOD PRESSURE: 112 MMHG | DIASTOLIC BLOOD PRESSURE: 52 MMHG | WEIGHT: 128.8 LBS | OXYGEN SATURATION: 100 % | HEART RATE: 70 BPM | TEMPERATURE: 99.6 F | BODY MASS INDEX: 20.21 KG/M2

## 2023-04-10 DIAGNOSIS — R06.82 TACHYPNEA: Chronic | ICD-10-CM

## 2023-04-10 DIAGNOSIS — E53.8 B12 DEFICIENCY: ICD-10-CM

## 2023-04-10 DIAGNOSIS — R00.2 INTERMITTENT PALPITATIONS: ICD-10-CM

## 2023-04-10 DIAGNOSIS — J02.9 ACUTE SORE THROAT: Chronic | ICD-10-CM

## 2023-04-10 DIAGNOSIS — F41.0 PANIC ATTACK: Chronic | ICD-10-CM

## 2023-04-10 DIAGNOSIS — E55.9 VITAMIN D DEFICIENCY: ICD-10-CM

## 2023-04-10 DIAGNOSIS — N92.0 MENORRHAGIA WITH REGULAR CYCLE: ICD-10-CM

## 2023-04-10 DIAGNOSIS — R00.2 INTERMITTENT PALPITATIONS: Primary | Chronic | ICD-10-CM

## 2023-04-10 DIAGNOSIS — R05.1 ACUTE COUGH: Chronic | ICD-10-CM

## 2023-04-10 LAB
BASOPHILS # BLD AUTO: 0.02 10*3/MM3 (ref 0–0.2)
BASOPHILS NFR BLD AUTO: 0.3 % (ref 0–1.5)
DEPRECATED RDW RBC AUTO: 39.1 FL (ref 37–54)
EOSINOPHIL # BLD AUTO: 0.15 10*3/MM3 (ref 0–0.4)
EOSINOPHIL NFR BLD AUTO: 2.4 % (ref 0.3–6.2)
ERYTHROCYTE [DISTWIDTH] IN BLOOD BY AUTOMATED COUNT: 11.8 % (ref 12.3–15.4)
HCT VFR BLD AUTO: 42.5 % (ref 34–46.6)
HGB BLD-MCNC: 14.5 G/DL (ref 12–15.9)
IMM GRANULOCYTES # BLD AUTO: 0.01 10*3/MM3 (ref 0–0.05)
IMM GRANULOCYTES NFR BLD AUTO: 0.2 % (ref 0–0.5)
LYMPHOCYTES # BLD AUTO: 1.27 10*3/MM3 (ref 0.7–3.1)
LYMPHOCYTES NFR BLD AUTO: 20.1 % (ref 19.6–45.3)
MCH RBC QN AUTO: 31.5 PG (ref 26.6–33)
MCHC RBC AUTO-ENTMCNC: 34.1 G/DL (ref 31.5–35.7)
MCV RBC AUTO: 92.2 FL (ref 79–97)
MONOCYTES # BLD AUTO: 0.44 10*3/MM3 (ref 0.1–0.9)
MONOCYTES NFR BLD AUTO: 7 % (ref 5–12)
NEUTROPHILS NFR BLD AUTO: 4.42 10*3/MM3 (ref 1.7–7)
NEUTROPHILS NFR BLD AUTO: 70 % (ref 42.7–76)
NRBC BLD AUTO-RTO: 0 /100 WBC (ref 0–0.2)
PLATELET # BLD AUTO: 257 10*3/MM3 (ref 140–450)
PMV BLD AUTO: 10.1 FL (ref 6–12)
RBC # BLD AUTO: 4.61 10*6/MM3 (ref 3.77–5.28)
WBC NRBC COR # BLD: 6.31 10*3/MM3 (ref 3.4–10.8)

## 2023-04-10 PROCEDURE — 99214 OFFICE O/P EST MOD 30 MIN: CPT | Performed by: INTERNAL MEDICINE

## 2023-04-10 PROCEDURE — 82306 VITAMIN D 25 HYDROXY: CPT

## 2023-04-10 PROCEDURE — 80050 GENERAL HEALTH PANEL: CPT

## 2023-04-10 PROCEDURE — 93000 ELECTROCARDIOGRAM COMPLETE: CPT | Performed by: INTERNAL MEDICINE

## 2023-04-10 PROCEDURE — 82728 ASSAY OF FERRITIN: CPT

## 2023-04-10 PROCEDURE — 36415 COLL VENOUS BLD VENIPUNCTURE: CPT

## 2023-04-10 PROCEDURE — 82607 VITAMIN B-12: CPT

## 2023-04-10 PROCEDURE — 83735 ASSAY OF MAGNESIUM: CPT

## 2023-04-10 RX ORDER — MELATONIN
1000 DAILY
Qty: 60 TABLET | Refills: 5
Start: 2023-04-10 | End: 2023-04-13

## 2023-04-10 RX ORDER — ONDANSETRON 4 MG/1
TABLET, FILM COATED ORAL
COMMUNITY
Start: 2023-01-30 | End: 2023-04-10

## 2023-04-10 RX ORDER — CYANOCOBALAMIN (VITAMIN B-12) 500 MCG
500 TABLET ORAL DAILY
Qty: 90 TABLET | Refills: 1
Start: 2023-04-10 | End: 2023-04-13

## 2023-04-10 RX ORDER — ERYTHROMYCIN 5 MG/G
OINTMENT OPHTHALMIC
COMMUNITY
Start: 2023-01-21 | End: 2023-04-10

## 2023-04-10 NOTE — ASSESSMENT & PLAN NOTE
"She is having episodes where she \"feels like she is having a heart attack,\" but denies any chest pain. She feels that she cannot get a deep breath yet. She admits she is breathing quickly. She feels like the heart is beating too slow or might stop. It has always happened when she lays down in bed, but before she goes to sleep. It has never happened during the day. She has been told by her friend who was with her during a couple of the attacks that it was a panic attack. She denies feeling anxious at all. She does not feel the days when it happened were any different than any other days. She denies being an anxious person or being a worrier. It would be nice to get a Holter monitor, but the episodes have been months apart, so I think the best way to see what the heart rate and rhythm is at the time is to have her go to the ER immediately when she feels the symptoms start. We will check labs today including blood counts, thyroid, and electrolytes.   "

## 2023-04-10 NOTE — PROGRESS NOTES
[facility]     Fiona Thacker  1995   [id number]      Patient Care Team:  Yi Perez MD as PCP - General (Internal Medicine)  Gisela Crocker MD as Obstetrician (Obstetrics and Gynecology)    Chief Complaint   Patient presents with   • Panic Attack     Rarely occurs, but happened recently            HPI  Patient is a 28 y.o. female who presents today for a follow-up.    Palpitations  The patient states that she cannot correlate the day with the episodes. She states that it was a normal day and she went to work and was fine. She states that she has been a lot more busy lately. The patient feels anxious when she feels it in her chest. She then worries about it the rest of the night if she cannot get comfortable. The patient states she is breathing fast when that happens. In her head it feels like she is having a heart attack. When her ex was there, he would talk to her and tell her that she was not having a heart attack. The patient states until she knew that she was not having a heart attack she would be panicked. The patient does check her blood pressure or pulse when it happens. The episode would last about 30 minutes. There is no pain. Afterwards she has to lay on her back or belly flat because if she lays on her side, she feels like her heart is crunched up. The symptoms do not start when she is laying on her side. She states does not feel any anxiety between those episodes.     Hyperactivity  The patient states that her mood has changed lately. A lot of people at work have pointed it out. The patient states that she was used to working outside walking in [employer]. 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 Everyone thinks she has ADHD. The patient states that she cannot sit still. She states that she is always up doing something. The patient states that she feels pretty hyper and too energetic. She reports it is hard to sleep sometimes. The patient  has a lot of energy.    Vitamin D deficiency  The patient states that she was doing good for a while, but lately she has been slacking. She states that she tried Flintstones and she tolerated those well.    Low iron  She is taking Flintstones and has tolerated them well.     Menstrual cycle  The patient states that her periods are still regular. She states that they are really heavy. The patient states that she sees Dr. Aguilar.    Sore throat  The patient states that her voice and throat have been really sore for a couple of days. She states that she cannot cough. The patient states that it is a real dry cough. She reports her taste is off. The patient had chills yesterday. She states that she felt warm, but she did not take her temperature. The patient states that she is having a lot of drainage down the back of her throat. She states that it kind of gets stuck. The patient states that her nose is clear. She states that there might have been some drainage in her ear the other day, but did not hurt. The patient did not do a home COVID-19 test. She states that her little cousin has been coughing and had a stuffy nose for a week now.        CHRONIC CONDITIONS      Past Medical History:   Diagnosis Date   • Anemia    • Chronic left-sided low back pain without sciatica 2019   • Menorrhagia     improved with OCPs   • Tobacco abuse 2019       Past Surgical History:   Procedure Laterality Date   • TONSILLECTOMY         Family History   Problem Relation Age of Onset   • Hypertension Mother    • Scoliosis Mother    • Diabetes Maternal Grandmother         TYPE 2    • Cancer Maternal Grandfather 50        prostate   • Arthritis Other        Social History     Socioeconomic History   • Marital status: Single   Tobacco Use   • Smoking status: Former     Packs/day: 0.50     Types: Cigarettes     Quit date: 1/3/2019     Years since quittin.2   • Smokeless tobacco: Never   Substance and Sexual Activity   • Alcohol  "use: No   • Drug use: Defer   • Sexual activity: Defer       Allergies   Allergen Reactions   • Penicillins Unknown (See Comments)     Childhood allergy         Vital Signs  Vitals:    04/10/23 1403   BP: 112/52   BP Location: Left arm   Patient Position: Sitting   Cuff Size: Adult   Pulse: 70   Temp: 99.6 °F (37.6 °C)   TempSrc: Infrared   SpO2: 100%   Weight: 58.4 kg (128 lb 12.8 oz)   Height: 170.2 cm (67.01\")     Body mass index is 20.17 kg/m².  BMI is within normal parameters. No other follow-up for BMI required.        Current Outpatient Medications:   •  cholecalciferol (Vitamin D, Cholecalciferol,) 25 MCG (1000 UT) tablet, Take 1 tablet by mouth Daily., Disp: 60 tablet, Rfl: 5  •  cyanocobalamin (VITAMIN B-12) 500 MCG tablet, Take 1 tablet by mouth Daily., Disp: 90 tablet, Rfl: 1    Physical Exam:    Physical Exam  Vitals and nursing note reviewed.   Constitutional:       Appearance: She is well-developed.   HENT:      Head: Normocephalic.      Ears:      Comments: Both TMs and ear canals normal.     Nose:      Comments: Nares both red with marked congestion and swelling.     Mouth/Throat:      Comments: Oropharynx clear.   Eyes:      Conjunctiva/sclera: Conjunctivae normal.      Pupils: Pupils are equal, round, and reactive to light.   Neck:      Thyroid: No thyromegaly.   Cardiovascular:      Rate and Rhythm: Normal rate and regular rhythm.      Heart sounds: Normal heart sounds.   Pulmonary:      Effort: Pulmonary effort is normal.      Breath sounds: Normal breath sounds.   Musculoskeletal:         General: Normal range of motion.      Cervical back: Normal range of motion and neck supple.   Lymphadenopathy:      Cervical: No cervical adenopathy.   Neurological:      Mental Status: She is alert and oriented to person, place, and time.   Psychiatric:         Thought Content: Thought content normal.            ECG 12 Lead    Date/Time: 4/10/2023 2:53 PM  Performed by: Yi Perez MD  Authorized by: " "Yi Perez MD   Comparison: compared with previous ECG   Rhythm: sinus bradycardia  Rate: normal  BPM: 59  Conduction: conduction normal  ST Segments: ST segments normal  T inversion: V1, V2 and V3  QRS axis: normal  Other findings: non-specific ST-T wave changes    Clinical impression: non-specific ECG            ACE III MINI        Results Review:    I reviewed the patient's new clinical results.    CMP:  Lab Results   Component Value Date    BUN 10 04/10/2023    CREATININE 0.77 04/10/2023    EGFRIFNONA 123 05/28/2019    BCR 13.0 04/10/2023     04/10/2023    K 3.5 04/10/2023    CO2 27.4 04/10/2023    CALCIUM 9.6 04/10/2023    ALBUMIN 4.7 04/10/2023    BILITOT 0.3 04/10/2023    ALKPHOS 56 04/10/2023    AST 16 04/10/2023    ALT 9 04/10/2023     HbA1c:  No results found for: HGBA1C  Microalbumin:  No results found for: MICROALBUR, POCMALB, POCCREAT  Lipid Panel  Lab Results   Component Value Date    CHOL 171 06/14/2022    TRIG 54 06/14/2022    HDL 68 (H) 06/14/2022    LDL 92 06/14/2022    AST 16 04/10/2023    ALT 9 04/10/2023       Medication Review: Medications reviewed and noted  Patient Instructions   Problem List Items Addressed This Visit        Cardiac and Vasculature    Intermittent palpitations - Primary (Chronic)    Current Assessment & Plan     She is having episodes where she \"feels like she is having a heart attack,\" but denies any chest pain. She feels that she cannot get a deep breath yet. She admits she is breathing quickly. She feels like the heart is beating too slow or might stop. It has always happened when she lays down in bed, but before she goes to sleep. It has never happened during the day. She has been told by her friend who was with her during a couple of the attacks that it was a panic attack. She denies feeling anxious at all. She does not feel the days when it happened were any different than any other days. She denies being an anxious person or being a worrier. It would be " nice to get a Holter monitor, but the episodes have been months apart, so I think the best way to see what the heart rate and rhythm is at the time is to have her go to the ER immediately when she feels the symptoms start. We will check labs today including blood counts, thyroid, and electrolytes.          Relevant Orders    Comprehensive Metabolic Panel (Completed)    CBC & Differential (Completed)    Urinalysis With Microscopic - Urine, Clean Catch    TSH (Completed)    Magnesium (Completed)    ECG 12 Lead       Endocrine and Metabolic    Vitamin D deficiency (Chronic)    Current Assessment & Plan     Recheck level today.         Relevant Orders    Vitamin D,25-Hydroxy (Completed)    B12 deficiency    Current Assessment & Plan     We will recheck B12 level today.          Relevant Orders    Vitamin B12 (Completed)       Genitourinary and Reproductive     Menorrhagia with regular cycle    Current Assessment & Plan     We will recheck B12 and ferritin levels. She is encouraged to take [place] gummy vitamins with iron 2 once a day.          Relevant Orders    Ferritin (Completed)       Infectious Diseases    Acute sore throat (Chronic)    Current Assessment & Plan     Influenza and COVID-19 tests were done. She is advised to do warm saltwater gargles as needed.            Mental Health    Panic attack (Chronic)    Current Assessment & Plan     See above. Since she does not feel she has anxiety other than these few attacks, we will not treat but watch. She will try to keep a journal of symptoms and possible triggers to bring to her next appointment.            Pulmonary and Pneumonias    Tachypnea (Chronic)    Current Assessment & Plan     See above.             Other    Acute cough (Chronic)    Current Assessment & Plan     Influenza and COVID-19 tests were done.                Diagnosis Plan   1. Intermittent palpitations  Comprehensive Metabolic Panel    CBC & Differential    Urinalysis With Microscopic - Urine,  Clean Catch    TSH    Magnesium    ECG 12 Lead      2. Tachypnea        3. Acute sore throat        4. Acute cough        5. Panic attack        6. Menorrhagia with regular cycle  Ferritin      7. B12 deficiency  Vitamin B12      8. Vitamin D deficiency  Vitamin D,25-Hydroxy        Follow-up:  I will see her back in a couple of months.        Plan of care reviewed with patient at the conclusion of today's visit. Education was provided regarding diagnosis, management, and any prescribed or recommended OTC medications.Patient verbalizes understanding of and agreement with management plan.         Yi Perez MD      Transcribed from ambient dictation for Yi Perez MD by June Berry.  04/10/23   16:39 EDT    Patient or patient representative verbalized consent to the visit recording.  I have personally performed the services described in this document as transcribed by the above individual, and it is both accurate and complete.

## 2023-04-10 NOTE — ASSESSMENT & PLAN NOTE
See above. Since she does not feel she has anxiety other than these few attacks, we will not treat but watch. She will try to keep a journal of symptoms and possible triggers to bring to her next appointment.

## 2023-04-10 NOTE — ASSESSMENT & PLAN NOTE
We will recheck B12 and ferritin levels. She is encouraged to take [place] gummy vitamins with iron 2 once a day.

## 2023-04-11 LAB
25(OH)D3 SERPL-MCNC: 27.3 NG/ML (ref 30–100)
ALBUMIN SERPL-MCNC: 4.7 G/DL (ref 3.5–5.2)
ALBUMIN/GLOB SERPL: 1.7 G/DL
ALP SERPL-CCNC: 56 U/L (ref 39–117)
ALT SERPL W P-5'-P-CCNC: 9 U/L (ref 1–33)
ANION GAP SERPL CALCULATED.3IONS-SCNC: 9.6 MMOL/L (ref 5–15)
AST SERPL-CCNC: 16 U/L (ref 1–32)
BILIRUB SERPL-MCNC: 0.3 MG/DL (ref 0–1.2)
BUN SERPL-MCNC: 10 MG/DL (ref 6–20)
BUN/CREAT SERPL: 13 (ref 7–25)
CALCIUM SPEC-SCNC: 9.6 MG/DL (ref 8.6–10.5)
CHLORIDE SERPL-SCNC: 105 MMOL/L (ref 98–107)
CO2 SERPL-SCNC: 27.4 MMOL/L (ref 22–29)
CREAT SERPL-MCNC: 0.77 MG/DL (ref 0.57–1)
EGFRCR SERPLBLD CKD-EPI 2021: 109.3 ML/MIN/1.73
FERRITIN SERPL-MCNC: 15.4 NG/ML (ref 13–150)
GLOBULIN UR ELPH-MCNC: 2.8 GM/DL
GLUCOSE SERPL-MCNC: 87 MG/DL (ref 65–99)
MAGNESIUM SERPL-MCNC: 2.1 MG/DL (ref 1.6–2.6)
POTASSIUM SERPL-SCNC: 3.5 MMOL/L (ref 3.5–5.2)
PROT SERPL-MCNC: 7.5 G/DL (ref 6–8.5)
SODIUM SERPL-SCNC: 142 MMOL/L (ref 136–145)
TSH SERPL DL<=0.05 MIU/L-ACNC: 1.49 UIU/ML (ref 0.27–4.2)
VIT B12 BLD-MCNC: 413 PG/ML (ref 211–946)

## 2023-04-12 NOTE — PATIENT INSTRUCTIONS
"Patient Instructions   Problem List Items Addressed This Visit          Cardiac and Vasculature    Intermittent palpitations - Primary (Chronic)    Current Assessment & Plan     She is having episodes where she \"feels like she is having a heart attack,\" but denies any chest pain. She feels that she cannot get a deep breath yet. She admits she is breathing quickly. She feels like the heart is beating too slow or might stop. It has always happened when she lays down in bed, but before she goes to sleep. It has never happened during the day. She has been told by her friend who was with her during a couple of the attacks that it was a panic attack. She denies feeling anxious at all. She does not feel the days when it happened were any different than any other days. She denies being an anxious person or being a worrier. It would be nice to get a Holter monitor, but the episodes have been months apart, so I think the best way to see what the heart rate and rhythm is at the time is to have her go to the ER immediately when she feels the symptoms start. We will check labs today including blood counts, thyroid, and electrolytes.          Relevant Orders    Comprehensive Metabolic Panel (Completed)    CBC & Differential (Completed)    Urinalysis With Microscopic - Urine, Clean Catch    TSH (Completed)    Magnesium (Completed)    ECG 12 Lead       Endocrine and Metabolic    Vitamin D deficiency (Chronic)    Current Assessment & Plan     Recheck level today.         Relevant Orders    Vitamin D,25-Hydroxy (Completed)    B12 deficiency    Current Assessment & Plan     We will recheck B12 level today.          Relevant Orders    Vitamin B12 (Completed)       Genitourinary and Reproductive     Menorrhagia with regular cycle    Current Assessment & Plan     We will recheck B12 and ferritin levels. She is encouraged to take [place] gummy vitamins with iron 2 once a day.          Relevant Orders    Ferritin (Completed)       " Infectious Diseases    Acute sore throat (Chronic)    Current Assessment & Plan     Influenza and COVID-19 tests were done. She is advised to do warm saltwater gargles as needed.            Mental Health    Panic attack (Chronic)    Current Assessment & Plan     See above. Since she does not feel she has anxiety other than these few attacks, we will not treat but watch. She will try to keep a journal of symptoms and possible triggers to bring to her next appointment.            Pulmonary and Pneumonias    Tachypnea (Chronic)    Current Assessment & Plan     See above.             Other    Acute cough (Chronic)    Current Assessment & Plan     Influenza and COVID-19 tests were done.

## 2023-04-13 RX ORDER — IRON,CARB/VIT C/VIT B12/FOLIC 100-250-1
1 TABLET ORAL DAILY
Qty: 90 EACH | Refills: 3 | Status: SHIPPED | OUTPATIENT
Start: 2023-04-13

## 2023-06-19 PROBLEM — F41.1 GENERALIZED ANXIETY DISORDER: Chronic | Status: ACTIVE | Noted: 2023-06-19

## 2023-12-13 ENCOUNTER — TELEPHONE (OUTPATIENT)
Dept: INTERNAL MEDICINE | Facility: CLINIC | Age: 28
End: 2023-12-13
Payer: COMMERCIAL

## 2023-12-13 RX ORDER — BUSPIRONE HYDROCHLORIDE 5 MG/1
5 TABLET ORAL 3 TIMES DAILY PRN
Qty: 90 TABLET | Refills: 1 | Status: SHIPPED | OUTPATIENT
Start: 2023-12-13

## 2023-12-13 RX ORDER — FLUOXETINE HYDROCHLORIDE 20 MG/1
20 CAPSULE ORAL DAILY
Qty: 30 CAPSULE | Refills: 5 | Status: SHIPPED | OUTPATIENT
Start: 2023-12-13

## 2023-12-13 NOTE — TELEPHONE ENCOUNTER
Caller: Fiona Thacker    Relationship: Self    Best call back number: [phone]     What was the call regarding: PATIENT IS GOING THROUGH REALLY HARD TIMES AND FEELS SHE NEEDS TO BE ON ANXIETY OR DEPRESSIONS MEDICATION. PLEASE CALL BACK TO DISCUSS.     Is it okay if the provider responds through MyChart: YES